# Patient Record
Sex: FEMALE | Race: OTHER | Employment: PART TIME | URBAN - NONMETROPOLITAN AREA
[De-identification: names, ages, dates, MRNs, and addresses within clinical notes are randomized per-mention and may not be internally consistent; named-entity substitution may affect disease eponyms.]

---

## 2017-05-22 ENCOUNTER — OFFICE VISIT (OUTPATIENT)
Dept: FAMILY MEDICINE CLINIC | Age: 47
End: 2017-05-22

## 2017-05-22 VITALS
WEIGHT: 154 LBS | DIASTOLIC BLOOD PRESSURE: 68 MMHG | HEART RATE: 56 BPM | HEIGHT: 61 IN | TEMPERATURE: 97.8 F | BODY MASS INDEX: 29.07 KG/M2 | SYSTOLIC BLOOD PRESSURE: 142 MMHG

## 2017-05-22 DIAGNOSIS — Z13.0 SCREENING FOR IRON DEFICIENCY ANEMIA: Primary | ICD-10-CM

## 2017-05-22 DIAGNOSIS — R60.9 EDEMA, UNSPECIFIED TYPE: ICD-10-CM

## 2017-05-22 DIAGNOSIS — R60.9 DEPENDENT EDEMA: ICD-10-CM

## 2017-05-22 DIAGNOSIS — D64.9 ANEMIA, UNSPECIFIED TYPE: ICD-10-CM

## 2017-05-22 DIAGNOSIS — G47.00 INSOMNIA, UNSPECIFIED TYPE: ICD-10-CM

## 2017-05-22 LAB — HGB BLD-MCNC: 10.4 G/DL

## 2017-05-22 RX ORDER — LANOLIN ALCOHOL/MO/W.PET/CERES
325 CREAM (GRAM) TOPICAL
Qty: 90 TAB | Refills: 1 | Status: SHIPPED | OUTPATIENT
Start: 2017-05-22 | End: 2017-07-13 | Stop reason: SDUPTHER

## 2017-05-22 RX ORDER — AMITRIPTYLINE HYDROCHLORIDE 10 MG/1
10 TABLET, FILM COATED ORAL
Qty: 30 TAB | Refills: 1 | Status: SHIPPED | OUTPATIENT
Start: 2017-05-22 | End: 2017-07-13 | Stop reason: SDUPTHER

## 2017-05-22 RX ORDER — HYDROCHLOROTHIAZIDE 12.5 MG/1
12.5 CAPSULE ORAL DAILY
Qty: 90 CAP | Refills: 1 | Status: SHIPPED | OUTPATIENT
Start: 2017-05-22 | End: 2017-07-13 | Stop reason: SDUPTHER

## 2017-05-22 NOTE — PROGRESS NOTES
Results for orders placed or performed in visit on 05/22/17   AMB POC HEMOGLOBIN (HGB)   Result Value Ref Range    Hemoglobin (POC) 10.4

## 2017-05-22 NOTE — PROGRESS NOTES
Assessment/Plan:    Nehal Garcia was seen today for breast pain and ankle swelling. Diagnoses and all orders for this visit:    Screening for iron deficiency anemia  -     AMB POC HEMOGLOBIN (HGB)    Insomnia, unspecified type  -     amitriptyline (ELAVIL) 10 mg tablet; Take 1 Tab by mouth nightly as needed for Sleep. cheryl 1 tab por la boca antes de acostarse    Edema, unspecified type  -     hydroCHLOROthiazide (MICROZIDE) 12.5 mg capsule; Take 1 Cap by mouth daily. Cheryl 1 capsula cada ale para los pies  h and p suggestive of dependent edema- buy support hose, increase physical activity    Anemia, unspecified type  -     ferrous sulfate (IRON, FERROUS SULFATE,) 325 mg (65 mg iron) tablet; Take 1 Tab by mouth Daily (before breakfast). Dependent edema  Elevate legs, increase physical conditioning    Gyn clinic for pap and mammo      Follow-up Disposition:  Return in about 2 months (around 7/22/2017) for [place] gyn clinic with me. BROOKLYN Justin expressed understanding of this plan. An AVS was printed and given to the patient.      ----------------------------------------------------------------------    Chief Complaint   Patient presents with    Breast pain     left breast pain     Ankle swelling       History of Present Illness:  50 yo here for return on an old problem of lisa LE swelling after working all day. She works in a [employer] where she is on her feet all day. The problem resolves overnight and then returns with standing. She has not tried support hose yet. She has been off of her elavil, iron and hctz for > 3 months when her rx's ran out. She states that the hctz really was helping with her swelling previously. She also notest that the hotter weather makes this problem worse- she states that there is no a.c. In her work place- she works 6 days a week most weeks    She continues to have heavy periods with blood clots at times. She bleeds monthly for 3-5 days.  She is due for her gyn exam and we will schedule this for her today. She has painful breast (left side) before her period but states that she has never felt a mass in it    She has insomnia that was improved with her elavil but has run out of her medication. She would like to go back on the elavil. Past Medical History:   Diagnosis Date    Allergic rhinitis, cause unspecified 2/25/2013    Chest pain 2/25/2013    Colitis 2/25/2013    Dermatitis due to drugs and medicines taken internally 2/25/2013    Edema 2/25/2013    Gallstones     Leg pain 2/25/2013    Lump or mass in breast 2/25/2013    Mastodynia 2/25/2013    Unspecified essential hypertension 2/25/2013    Unspecified otitis media 2/25/2013       Current Outpatient Prescriptions   Medication Sig Dispense Refill    ferrous sulfate (IRON, FERROUS SULFATE,) 325 mg (65 mg iron) tablet Take 1 Tab by mouth Daily (before breakfast). 90 Tab 1    amitriptyline (ELAVIL) 10 mg tablet Take 1 Tab by mouth nightly as needed for Sleep. chhaya 1 tab por la boca antes de acostarse 30 Tab 1    hydroCHLOROthiazide (MICROZIDE) 12.5 mg capsule Take 1 Cap by mouth daily. Chhaya 1 capsula cada ale para los pies 90 Cap 1    miconazole (MONISTAT 7) 2 % vaginal cream Insert 1 Applicator into vagina nightly. 45 g 0    predniSONE (DELTASONE) 10 mg tablet Take 6 pills today and then 1 pill less daily until done. [place] 6 pastillas hoy y osvaldo minus cada ale hasta de todas terminar 21 Tab 0    loratadine (CLARITIN) 10 mg tablet Take 1 Tab by mouth daily. Chhaya 1 tableta cada ale para la garganta 90 Tab 1    triamcinolone (NASACORT) 55 mcg nasal inhaler 1 [place] by Both Nostrils route two (2) times a day.  1 spray cada fosa nasal 2 veces al ale 1 Bottle 1       No Known Allergies    Social History   Substance Use Topics    Smoking status: Never Smoker    Smokeless tobacco: Never Used    Alcohol use No       Family History   Problem Relation Age of Onset    Diabetes       neice Physical Exam:     Visit Vitals    /68 (BP 1 Location: Left arm, BP Patient Position: Supine)    Pulse (!) 56    Temp 97.8 °F (36.6 °C) (Oral)    Ht 5' 1.02\" (1.55 m)    Wt 154 lb (69.9 kg)    LMP 05/09/2017    BMI 29.08 kg/m2   gen looks well, pleasant    A&Ox3  WDWN NAD  Respirations normal and non labored  Ext- no swelling today.  A few mild appearing varicose veins

## 2017-05-22 NOTE — MR AVS SNAPSHOT
Visit Information Karin Vazquez Personal Médico Departamento Teléfono del Dep. Número de visita 5/22/2017  9:15 AM Lilliam Hutchinson, AmandaMad[facility] Care SystemZHENG[address] [phone] Follow-up Instructions Return in about 2 months (around 7/22/2017) for [place] gyn clinic with me. Upcoming Health Maintenance Date Due  
 PAP AKA CERVICAL CYTOLOGY 1/17/2016 INFLUENZA AGE 9 TO ADULT 8/1/2017 DTaP/Tdap/Td series (2 - Td) 1/17/2023 Alergias  Review Complete El: 3/28/2016 Por: ALEKSANDER Sharma del:  5/22/2017 No Known Allergies Vacunas actuales Revisadas el:  1/17/2013 Shimon Mitten Influenza Vaccine 1/17/2013 Influenza Vaccine (Quad) PF 10/26/2015, 10/27/2014 Tdap 1/17/2013 No revisadas esta visita You Were Diagnosed With   
  
 Obed Corbett Screening for iron deficiency anemia    -  Primary ICD-10-CM: Z13.0 ICD-9-CM: V78.0 Insomnia, unspecified type     ICD-10-CM: G47.00 ICD-9-CM: 780.52 Edema, unspecified type     ICD-10-CM: R60.9 ICD-9-CM: 727. 3 Anemia, unspecified type     ICD-10-CM: D64.9 ICD-9-CM: 285.9 Dependent edema     ICD-10-CM: R60.9 ICD-9-CM: 589. 3 Partes vitales PS Pulso Temperatura [place] ( percentil de crecimiento) Peso (percentil de crecimiento) LMP (última nara) 142/68 (BP 1 Location: Left arm, BP Patient Position: Supine) (!) 56 97.8 °F (36.6 °C) (Oral) 5' 1.02\" (1.55 m) 154 lb (69.9 kg) 05/09/2017 BMI [facility]) Estado obstétrico Estatus de tabaquísmo 29.08 kg/m2 Having regular periods Never Smoker Historial de signos vitales BMI and BSA Data Body Mass Index Body Surface Area 29.08 kg/m 2 1.73 m 2 [facility] Name Phone [address] [facility] [phone] Araujo lista de medicamentos actualizada Lista actualizada el: 5/22/17 10:16 AM.  Yaya Pickens use chavarria lista de medicamentos más reciente. amitriptyline 10 mg tablet También conocido lynda:  ELAVIL Take 1 Tab by mouth nightly as needed for Sleep. cheryl 1 tab por la boca antes de acostarse  
  
 ferrous sulfate 325 mg (65 mg iron) tablet También conocido lynda:  Iron (Ferrous Sulfate) Take 1 Tab by mouth Daily (before breakfast). hydroCHLOROthiazide 12.5 mg capsule También conocido lynda:  Jane Langeper Take 1 Cap by mouth daily. Cheryl 1 capsula cada ale para los pies  
  
 loratadine 10 mg tablet También conocido lynda:  Emy Odell Take 1 Tab by mouth daily. Cheryl 1 tableta cada ale para la garganta  
  
 miconazole 2 % vaginal cream  
También conocido lynda:  MONISTAT 7 Insert 1 Applicator into vagina nightly. NASACORT 55 mcg nasal inhaler Medicamento genérico:  triamcinolone 1 [place] by Both Nostrils route two (2) times a day. 1 spray cada fosa nasal 2 veces al ale  
  
 predniSONE 10 mg tablet También conocido lynda:  Euel Salaam Take 6 pills today and then 1 pill less daily until done. [place] 6 pastillas hoy y osvaldo minus cada ale WATZING de todas terminar Recetas Enviado a la [place] Refills  
 ferrous sulfate (IRON, FERROUS SULFATE,) 325 mg (65 mg iron) tablet 1 Sig: Take 1 Tab by mouth Daily (before breakfast). Class: Normal  
 [id number] [address] [place] [address] Ph #: [phone] Route: Oral  
 amitriptyline (ELAVIL) 10 mg tablet 1 Sig: Take 1 Tab by mouth nightly as needed for Sleep. cheryl 1 tab por la boca antes de acostarse Class: Normal  
 [id number] [address] [place] [address] Ph #: [phone] Route: Oral  
 hydroCHLOROthiazide (MICROZIDE) 12.5 mg capsule 1 Sig: Take 1 Cap by mouth daily. Cheryl 1 capsula cada ale para los pies  Class: Normal  
 [facility]: [facility] [address], [facility] #: [phone] Route: Oral  
  
Hicimos lo siguiente AMB POC HEMOGLOBIN (HGB) [24122 CPT(R)] Instrucciones de seguimiento Return in about 2 months (around 7/22/2017) for [place] gyn clinic with me. Instrucciones para el Paciente Anemia: Instrucciones de cuidado - [ Anemia: Care Instructions ] Instrucciones de cuidado La anemia es un bajo nivel de glóbulos rojos, que son los que transportan el oxígeno por el organismo. Muchas cosas pueden causar anemia. La falta de constance es osvaldo de las causas más comunes. Chavarria organismo necesita constance para producir hemoglobina, osvaldo sustancia de los glóbulos rojos que transporta el oxígeno de los pulmones a las células del organismo. Si no hay suficiente constance, el organismo produce glóbulos rojos más pequeños y en karthik cantidad. Debido a ello, las células de chavarria organismo no obtienen suficiente oxígeno y usted se sentirá cansado y débil. Y puede tener dificultad para concentrarse. El sangrado es la causa más común de la falta de constance. Podría tener sangrado menstrual abundante o hemorragias causadas por afecciones tales lynda úlceras, hemorroides o cáncer. El uso habitual de aspirina u otros medicamentos antiinflamatorios (lynda ibuprofeno) también puede causar sangrado en [employer]. La falta de constance en chavarria dieta también puede causar anemia, sobre todo en los momentos en los que el organismo necesita más constance, lynda matty el Nickola Red, la infancia y la adolescencia. Es posible que chavarria médico le haya recetado pastillas de constance. El tratamiento puede kira algunos meses hasta que chon niveles de constance regresen a la normalidad. Chavarria médico también puede sugerirle que consuma alimentos ricos en constance, lynda carne y frijoles (habichuelas). Existen muchas otras causas de la anemia.  No siempre es causada por la falta de constance. Descubrir la causa específica de chavarria anemia le ayudará a chavarria médico a encontrar el tratamiento adecuado para usted. La atención de seguimiento es osvaldo parte clave de chavarria tratamiento y seguridad. Asegúrese de hacer y acudir a todas las citas, y llame a chavarria médico si está teniendo problemas. También es osvaldo buena idea saber los resultados de los exámenes y mantener osvaldo lista de los medicamentos que cheryl. Cómo puede cuidarse en el hogar? · [employer] medicamentos exactamente lynda le fueron recetados. Llame a chavarria médico si gallo estar teniendo problemas con chavarria medicamento. · Si chavarria médico le recomienda pastillas de constance, tómelas según las indicaciones: ¨ Trate de kira las pastillas con el estómago vacío 1 hora antes de comer o 2 horas después de comer. Tunde podría necesitar kira el constance con la comida para evitar el malestar estomacal. 
¨ No tome antiácidos, leche o bebidas con cafeína (lynda café, té o bebidas de cola) al [employer] o 2 horas antes o después de kasi tomado las pastillas de constance. Estos pueden dificultar la absorción del constance en el organismo. ¨ La vitamina C (de alimentos o suplementos) ayuda a chavarria organismo a absorber el constance. Trate de kira las pastillas de constance con un vaso de jugo de naranja o con otro tipo de alimento rico en vitamina C, lynda las frutas cítricas. ¨ Las Rik [employer]Mail de constance podrían causar [employer], lynda acidez [place], Meet, diarrea, estreñimiento y retortijones. Asegúrese de beber abundantes líquidos e incluya en chavarria dieta diaria frutas, verduras y [place]. Las pastillas de constance muchas veces hacen que chon evacuaciones ana oscuras o verdosas. ¨ Si olvida kira chavarria pastilla de constance, no tome osvaldo dosis doble la próxima vez. ¨ Mantenga las pastillas de constance fuera del alcance de los niños pequeños. La sobredosis de constance puede ser [employer].  
· Siga los consejos de chavarria médico acerca del consumo de alimentos ricos en constance. Entre estos se encuentran las mary ojeda, los [place], las aves, los [place], los frijoles, las uvas pasas, el pan integral y las verduras de hoja sascha. · Prepare las verduras al vapor para que puedan retener chavarria contenido de constance. Cuándo debe pedir ayuda? Llame al 911 en cualquier momento que considere que necesita atención de [place]. Por ejemplo, llame si: · Tiene síntomas de un ataque al corazón. Estos podrían incluir: ¨ Dolor o presión en el pecho, o osvaldo sensación extraña en el pecho. ¨ Sudoración. ¨ Falta de aire. ¨ Náuseas o vómito. ¨ Dolor, presión o osvaldo sensación extraña en la espalda, el jaquan, la mandíbula, la parte superior del abdomen o en david o ambos hombros o brazos. ¨ Aturdimiento o debilidad repentina. ¨ Latidos del corazón rápidos o irregulares. Después de llamar al 911, es posible que el operador le diga que mastique 1 aspirina para adultos o de 2 a 4 aspirinas de dosis baja. Espere a osvaldo ambulancia. No intente conducir usted mismo. · Se desmayó (perdió el conocimiento). Llame a chavarria médico ahora mismo o busque atención médica inmediata si: · Presenta nueva o mayor falta de aire. · Siente mareos o aturdimiento, o que está a punto de Only. · La fatiga y la debilidad continúan o empeoran. · Tiene cualquier sangrado anormal, lynda: 
¨ Hemorragias (sangrado) nasales. ¨ Sangrado vaginal que es distinto (más intenso, más frecuente, en un momento diferente del mes) del que usted [facility] tener. ¨ Heces sanguinolentas o negras, o sangrado por el recto. ¨ Orina sanguinolenta o de color pitts. Preste especial atención a los cambios en chavarria anmol y asegúrese de comunicarse con chavarria médico si: 
· No mejora lynda se esperaba. Dónde puede encontrar más información en inglés? Silvino Pablo a http://nate-vivek.info/. [place] [address] en la búsqueda para aprender más acerca de \"Anemia: Instrucciones de cuidado - [ Anemia: Care Instructions ]. \" Revisado: 13 octubre, 2016 Versión del contenido: 11.2 © 6855-4977 Healthwise, Incorporated. Las instrucciones de cuidado fueron adaptadas bajo licencia por Good Help Connections (which disclaims liability or warranty for this information). Si usted tiene [place] afección médica o sobre estas instrucciones, siempre pregunte a chavarria profesional de anmol. Healthwise, Incorporated niega toda garantía o responsabilidad por chavarria uso de esta información. Edema en la pierna y el tobillo: Instrucciones de cuidado - [ Leg and Ankle Edema: Care Instructions ] Instrucciones de cuidado La hinchazón en las piernas, los tobillos y los pies se conoce lynda edema. Es común después de que usted permanece sentado o de pie matty un tiempo. Los viajes prolongados en avión o automóvil a menudo causan hinchazón en las piernas y los pies. También podría tener hinchazón si tiene que permanecer de pie matty largos períodos de tiempo en chavarria [place]. Los problemas de las venas en las piernas (Coral Angst) y los cambios hormonales también pueden causar hinchazón. A veces la hinchazón de los tobillos y los pies es causada por un problema más jonatan, caridad lynda insuficiencia cardíaca, infección, coágulos de Emily, o enfermedad del [facility] Medical Centerado o del Jenny Sida. La atención de seguimiento es osvaldo parte clave de chavarria tratamiento y seguridad. Asegúrese de hacer y acudir a todas las citas, y llame a chavarria médico si está teniendo problemas. También es osvaldo buena idea saber los resultados de los exámenes y mantener osvaldo lista de los medicamentos que cheryl. Cómo puede cuidarse en el hogar? · Si chavarria médico le recetó un medicamento, tómelo según las indicaciones. Llame a chavarria médico si gallo estar teniendo problemas con chavarria medicamento. · Siempre que descanse, eleve chon piernas. Trate de mantener la daniel hinchada por encima del nivel del corazón. · Si permanece mucho tiempo de pie o sentado en la misma posición, tome descansos. ¨ Camine para aumentar el flujo de [employer] parte inferior de las piernas. ¨ Mueva los pies y los tobillos con frecuencia mientras permanezca de pie, o contraiga y relaje los músculos de las piernas. · Use medias elásticas de soporte. Póngaselas en la mañana, antes de que la hinchazón empeore. · Siga osvaldo dieta balanceada. Baje de peso si es necesario. · Limite la cantidad de sal (sodio) en chavarria dieta. La sal retiene líquidos en el cuerpo y podría aumentar la hinchazón. Cuándo debe pedir ayuda? Llame al 911 en cualquier momento que considere que necesita atención de emergencia. Por ejemplo, llame si: · Tiene síntomas de un coágulo de amarjit en el pulmón (llamado embolia pulmonar). Éstos podrían incluir: ¨ Dolor repentino en el pecho. ¨ Dificultad para respirar. ¨ Tos con amarjit. Llame a chavarria médico ahora mismo o busque atención médica inmediata si: · Tiene señales de un coágulo de amarjit, tales lynda: ¨ Dolor en la pantorrilla, el muslo, la caty o detrás de la rodilla. ¨ Enrojecimiento e hinchazón en la pierna o la caty. · Tiene síntomas de Sergo Greco, tales lynda: ¨ Aumento del dolor, la hinchazón, el enrojecimiento o la temperatura. ¨ Vetas rojizas o pus. [place]. Preste especial atención a los cambios en chavarria anmol y asegúrese de comunicarse con chavarria médico si: 
· La hinchazón está empeorando. · Tiene dolor nuevo o que empeora en las piernas. · No mejora lynda se esperaba. Dónde puede encontrar más información en inglés? Robin Apple a http://nate-vivek.info/. Latricia Lopez W867 en la búsqueda para aprender más acerca de \"Edema en la pierna y el tobillo: Instrucciones de cuidado - [ Leg and Ankle Edema: Care Instructions ]. \" 
Revisado: 27 cadena, 2016 Versión del contenido: 11.2 © 2038-8753 [employer], Incorporated.  Las instrucciones de cuidado fueron adaptadas bajo licencia por Good Help Connections (which disclaims liability or warranty for this information). Si usted tiene [place] afección médica o sobre estas instrucciones, siempre pregunte a chavarria profesional de anmol. [facility], Incorporated niega toda garantía o responsabilidad por chavarria uso de esta información. Introducing [facility] SERVICES! [facility] introduce portal paciente MyChart . Ahora se puede acceder a partes de chavarria expediente médico, enviar por correo electrónico la oficina de chavarria médico y solicitar renovaciones de medicamentos en línea. En chavarria navegador de Internet , James Butler a [email or web address] Josué clic en el usuario por Erich Nava? Marten Curly clic aquí en la sesión Barnie Milling. Verá la página de registro [place]. Ingrese chavarria código de [facility] caridad y lynda aparece a continuación. Papito no tendrá que [employer] código después de kasi completado el proceso de registro . Si usted no se inscribe antes de la fecha de caducidad , debe solicitar un nuevo código. · MyChart Código de acceso : [id number] Expires: 8/20/2017 10:16 AM 
 
Ingresa los últimos cuatro dígitos de chavarria Número de Seguro Social ( xxxx ) y fecha de nacimiento ( dd / mm / aaaa ) lynda se indica y josué clic en Enviar. Papito será llevado a la siguiente página de registro . Crear un ID MyChart . Esta será chavarria ID de inicio de sesión de MyChart y no puede ser [place] , por lo que pensar en osvaldo que es Gerldine Schirmer y fácil de recordar . Crear osvaldo contraseña MyChart . Papito puede cambiar chavarria contraseña en cualquier momento . Ingrese chavarria Password Reset de preguntas y [place] . [place] se puede utilizar en un momento posterior si usted olvida chavarria contraseña. Introduzca chavarria dirección de correo electrónico . Jelly Barahona recibirá osvaldo notificación por correo electrónico cuando la nueva información está disponible en MyChart . Devonda Shobha sierra en Registrarse.  Destin Jaz jaquelin y descargar porciones de chavarria expediente médico. 
 Estrella mariela en el enlace de descarga del menú Resumen para descargar osvaldo copia portátil de chavarria información médica . Si tiene [employer] , por favor visite la sección de preguntas frecuentes del sitio web MyChart . Recuerde, MyChart NO es que se utilizará para las necesidades urgentes. Para emergencias médicas , llame al 911 . Ahora disponible en chavarria iPhone y Android ! Por favor proporcione rylee resumen de la documentación de cuidado a chavarria próximo proveedor. Your primary care clinician is listed as Gricel Haney. If you have any questions after today's visit, please call [phone].

## 2017-05-22 NOTE — PATIENT INSTRUCTIONS
Anemia: Instrucciones de cuidado - [ Anemia: Care Instructions ]  Instrucciones de cuidado    La anemia es un bajo nivel de glóbulos rojos, que son los que transportan el oxígeno por el organismo. Muchas cosas pueden causar anemia. La falta de constance es osvaldo de las causas más comunes. Chavarria organismo necesita constance para producir hemoglobina, osvaldo sustancia de los glóbulos rojos que transporta el oxígeno de los pulmones a las células del organismo. Si no hay suficiente constance, el organismo produce glóbulos rojos más pequeños y en karthik cantidad. Debido a ello, las células de chavarria organismo no obtienen suficiente oxígeno y usted se sentirá cansado y débil. Y puede tener dificultad para concentrarse. El sangrado es la causa más común de la falta de constance. Podría tener sangrado menstrual abundante o hemorragias causadas por afecciones tales lynda úlceras, hemorroides o cáncer. El uso habitual de aspirina u otros medicamentos antiinflamatorios (lynda ibuprofeno) también puede causar sangrado en [employer]. La falta de constance en chavarria dieta también puede causar anemia, sobre todo en los momentos en los que el organismo necesita más constance, lynda matty el [facility], la infancia y la adolescencia. Es posible que chavarria médico le haya recetado pastillas de constance. El tratamiento puede kira algunos meses hasta que chon niveles de constance regresen a la normalidad. Chavarria médico también puede sugerirle que consuma alimentos ricos en constance, lynda carne y frijoles (habichuelas). Existen muchas otras causas de la anemia. No siempre es causada por la falta de constance. Descubrir la causa específica de chavarria anemia le ayudará a chavarria médico a encontrar el tratamiento adecuado para usted. La atención de seguimiento es osvaldo parte clave de chavarria tratamiento y seguridad. Asegúrese de hacer y acudir a todas las citas, y llame a chavarria médico si está teniendo problemas.  También es osvaldo buena idea saber los resultados de los exámenes y [employer] lista de los medicamentos que cheryl. ¿Cómo puede cuidarse en el hogar? · [employer] medicamentos exactamente lynda le fueron recetados. Llame a chavarria médico si gallo estar teniendo problemas con chavarria medicamento. · Si chavarria médico le recomienda pastillas de constance, tómelas según las indicaciones:  ¨ Trate de kira las pastillas con el estómago vacío 1 hora antes de comer o 2 horas después de comer. Tunde podría necesitar kira el constance con la comida para evitar el malestar estomacal.  ¨ No tome antiácidos, leche o bebidas con cafeína (lynda café, té o bebidas de cola) al [employer] o 2 horas antes o después de kasi tomado las pastillas de constance. Estos pueden dificultar la absorción del constance en el organismo. ¨ La vitamina C (de alimentos o suplementos) ayuda a chavarria organismo a absorber el constance. Trate de kira las pastillas de constance con un vaso de jugo de naranja o con otro tipo de alimento rico en vitamina C, lynda las frutas cítricas. ¨ Las Rik Inuvo de constance podrían causar [employer], lynda acidez Ernul, Meet, diarrea, estreñimiento y retortijones. Asegúrese de beber abundantes líquidos e incluya en chavarria dieta diaria frutas, verduras y [place]. Las pastillas de constance muchas veces hacen que chon evacuaciones ana oscuras o verdosas. ¨ Si olvida kira chavarria pastilla de constance, no tome osvaldo dosis doble la próxima vez. ¨ Mantenga las pastillas de constance fuera del alcance de los niños pequeños. La sobredosis de constance puede ser [employer]. · Siga los consejos de chavarria médico acerca del consumo de alimentos ricos en constance. Entre estos se encuentran las mary ojeda, los [place], las aves, los [place], los frijoles, las uvas pasas, el pan integral y las verduras de hoja sascha. · Prepare las verduras al vapor para que puedan retener chavarria contenido de constance. ¿Cuándo debe pedir ayuda? Llame al 911 en cualquier momento que considere que necesita atención de [place].  Por ejemplo, llame si:  · Tiene síntomas de un ataque al corazón. Estos podrían incluir:  ¨ Dolor o presión en el pecho, o osvaldo sensación extraña en el pecho. ¨ Sudoración. ¨ Falta de aire. ¨ Náuseas o vómito. ¨ Dolor, presión o osvaldo sensación extraña en la espalda, el jaquan, la mandíbula, la parte superior del abdomen o en david o ambos hombros o brazos. ¨ Aturdimiento o debilidad repentina. ¨ Latidos del corazón rápidos o irregulares. Después de llamar al 911, es posible que el operador le diga que mastique 1 aspirina para adultos o de 2 a 4 aspirinas de dosis baja. Espere a osvaldo ambulancia. No intente conducir usted mismo. · Se desmayó (perdió el conocimiento). Llame a chavarria médico ahora mismo o busque atención médica inmediata si:  · Presenta nueva o mayor falta de aire. · Siente mareos o aturdimiento, o que está a punto de [place]. · La fatiga y la debilidad continúan o empeoran. · Tiene cualquier sangrado anormal, lynda:  ¨ Hemorragias (sangrado) nasales. ¨ Sangrado vaginal que es distinto (más intenso, más frecuente, en un momento diferente del mes) del que usted [facility] tener. ¨ Heces sanguinolentas o negras, o sangrado por el recto. ¨ Orina sanguinolenta o de color pitts. Preste especial atención a los cambios en chavarria anmol y asegúrese de comunicarse con chavarria médico si:  · No mejora lynda se esperaba. ¿Dónde puede encontrar más información en inglés? Jim Watkins a http://nate-vivek.info/. [place] N130 en la búsqueda para aprender más acerca de \"Anemia: Instrucciones de cuidado - [ Anemia: Care Instructions ]. \"  Revisado: 13 octubre, 2016  Versión del contenido: 11.2  © 1974-5046 [employer], [place]. Las instrucciones de cuidado fueron adaptadas bajo licencia por [employer] (which disclaims liability or warranty for this information). Si usted tiene [place] afección médica o sobre estas instrucciones, siempre pregunte a chavarria profesional de anmol.  Angelrosalva Bertrand por chavarria uso de esta información. Edema en la pierna y el tobillo: Instrucciones de cuidado - [ Leg and Ankle Edema: Care Instructions ]  Instrucciones de cuidado  La hinchazón en las piernas, los tobillos y los pies se conoce lynda edema. Es común después de que usted permanece sentado o de pie matty un tiempo. Los viajes prolongados en avión o automóvil a menudo causan hinchazón en las piernas y los pies. También podría tener hinchazón si tiene que permanecer de pie matty largos períodos de tiempo en chavarria [place]. Los problemas de las venas en las piernas ([place]) y los cambios hormonales también pueden causar hinchazón. A veces la hinchazón de los tobillos y los pies es causada por un problema más jonatan, caridad lynda insuficiencia cardíaca, infección, coágulos de [place], o enfermedad del hígado o del [place]. La atención de seguimiento es osvaldo parte clave de chavarria tratamiento y seguridad. Asegúrese de hacer y acudir a todas las citas, y llame a chavarria médico si está teniendo problemas. También es osvaldo buena idea saber los resultados de los exámenes y mantener osvaldo lista de los medicamentos que cheryl. ¿Cómo puede cuidarse en el hogar? · Si chavarria médico le recetó un medicamento, tómelo según las indicaciones. Llame a chavarria médico si gallo estar teniendo problemas con chavarria medicamento. · Siempre que descanse, eleve chon piernas. Trate de mantener la daniel hinchada por encima del nivel del corazón. · Si permanece mucho tiempo de pie o sentado en la misma posición, tome descansos. ¨ Camine para aumentar el flujo de [employer] parte inferior de las piernas. ¨ Mueva los pies y los tobillos con frecuencia mientras permanezca de pie, o contraiga y relaje los músculos de las piernas. · Use medias elásticas de soporte. Póngaselas en la mañana, antes de que la hinchazón empeore. · Siga osvaldo dieta balanceada. Baje de peso si es necesario. · Limite la cantidad de sal (sodio) en chavarria dieta.  La sal retiene líquidos en el cuerpo y podría aumentar la hinchazón. ¿Cuándo debe pedir ayuda? Llame al 911 en cualquier momento que considere que necesita atención de emergencia. Por ejemplo, llame si:  · Tiene síntomas de un coágulo de amarjit en el pulmón (llamado embolia pulmonar). Éstos podrían incluir:  ¨ Dolor repentino en el pecho. ¨ Dificultad para respirar. ¨ Tos con amarjit. Llame a chavarria médico ahora mismo o busque atención médica inmediata si:  · Tiene señales de un coágulo de amarjit, tales lynda:  ¨ Dolor en la pantorrilla, el muslo, la caty o detrás de la rodilla. ¨ Enrojecimiento e hinchazón en la pierna o la caty. · Tiene síntomas de Sergo [place]co, tales lynda:  ¨ Aumento del dolor, la hinchazón, el enrojecimiento o la temperatura. ¨ Vetas rojizas o pus. Lizbeth García. Preste especial atención a los cambios en chavarria anmol y asegúrese de comunicarse con chavarria médico si:  · La hinchazón está empeorando. · Tiene dolor nuevo o que empeora en las piernas. · No mejora lynda se esperaba. ¿Dónde puede encontrar más información en inglés? Charles Kiran a http://nate-vivek.info/. Korin Person Y995 en la búsqueda para aprender más acerca de \"Edema en la pierna y el tobillo: Instrucciones de cuidado - [ Leg and Ankle Edema: Care Instructions ]. \"  Revisado: 27 cadena, 2016  Versión del contenido: 11.2  © 8412-2597 [employer], [employer]. Las instrucciones de cuidado fueron adaptadas bajo licencia por [employer] (which disclaims liability or warranty for this information). Si usted tiene [place] afección médica o sobre estas instrucciones, siempre pregunte a chavarria profesional de anmol. [facility]orporated niega toda garantía o responsabilidad por chavarria uso de esta información.

## 2017-06-08 ENCOUNTER — HOSPITAL ENCOUNTER (EMERGENCY)
Age: 47
Discharge: HOME | End: 2017-06-08
Attending: EMERGENCY MEDICINE
Payer: SELF-PAY

## 2017-06-08 VITALS
WEIGHT: 152 LBS | SYSTOLIC BLOOD PRESSURE: 125 MMHG | DIASTOLIC BLOOD PRESSURE: 81 MMHG | HEART RATE: 69 BPM | BODY MASS INDEX: 27.97 KG/M2 | OXYGEN SATURATION: 100 % | TEMPERATURE: 97.8 F | HEIGHT: 62 IN | RESPIRATION RATE: 16 BRPM

## 2017-06-08 DIAGNOSIS — N64.4 BREAST PAIN, LEFT: Primary | ICD-10-CM

## 2017-06-08 PROCEDURE — 99282 EMERGENCY DEPT VISIT SF MDM: CPT

## 2017-06-08 RX ORDER — NAPROXEN 500 MG/1
500 TABLET ORAL
Qty: 20 TAB | Refills: 0 | Status: SHIPPED | OUTPATIENT
Start: 2017-06-08

## 2017-06-08 NOTE — DISCHARGE INSTRUCTIONS
Dolor de senos: Instrucciones de cuidado - [ Breast Pain: Care Instructions ]  Instrucciones de cuidado  La sensibilidad y el dolor de senos podría aparecer y desaparecer con los periodos menstruales (cíclico) o podría no seguir ningún patrón (no cíclico). El dolor de senos kendy vez es causado por un problema de anmol grave. Podría ser necesario hacer pruebas para averiguar la causa. La atención de seguimiento es osvaldo parte clave de chavarria tratamiento y seguridad. Asegúrese de hacer y acudir a todas las citas, y llame a chavarria médico si está teniendo problemas. También es osvaldo buena idea saber los resultados de los exámenes y mantener osvaldo lista de los medicamentos que cheryl. ¿Cómo puede cuidarse en el hogar? · Si chavarria médico le recetó un medicamento, tómelo exactamente según las indicaciones. Llame a chavarria médico si gallo estar teniendo problemas con chavarria medicamento. · Para aliviar el dolor y la hinchazón, tome un analgésico (medicamento para el dolor) de venta rosibel, lynda acetaminofén (Tylenol), ibuprofeno (Advil, Motrin) o naproxeno (Aleve). Batsheva y siga todas las instrucciones de la [place]. · No tome dos o más analgésicos al [employer], a menos que el médico se lo haya indicado. Muchos analgésicos contienen acetaminofén, es decir, Tylenol. El exceso de acetaminofén (Tylenol) puede ser dañino. · Use un sostén que le dé buen soporte, lynda los que se usan para hacer deporte o correr. · Reduzca la cantidad de grasa en chavarria dieta. Si necesita ayuda para planificar comidas saludables, consulte a un dietista. · Estrella por lo menos 30 minutos de ejercicio la mayoría de los días de la [place]. Caminar es osvaldo buena opción. Es posible que también quiera hacer otras actividades, lynda correr, nadar, [employer], o jugar al tenis o practicar otros deportes de equipo. · Mantenga un patrón de sueño saludable. Eri Michel a dormir a la misma hora todas las noches y levántese a la misma hora cada día. ¿Cuándo debe pedir ayuda?   Llame a araujo médico ahora mismo o busque atención médica inmediata si:  · Tiene cambios nuevos en un seno, lynda:  ¨ Un bulto o engrosamiento en el seno o la axila. ¨ Un cambio en el tamaño o la forma del seno. ¨ Cambios en la piel, lynda hoyuelos o arrugas. ¨ Secreción de [employer]. ¨ Un cambio en el color o la textura de la piel del seno o la daniel oscura alrededor del pezón (areola). ¨ Un cambio en la forma del pezón (podría parecer lynda si estuviera hundido en el seno). · Tiene síntomas de infección en el seno, tales lynda:  ¨ Aumento del dolor, la hinchazón, el enrojecimiento o la temperatura alrededor del seno. ¨ Vetas rojizas que se extienden desde el seno. ¨ Pus que supura de un seno. Neomia Revels. Preste especial atención a los cambios en araujo anmol y asegúrese de comunicarse con araujo médico si:  · Araujo dolor de los senos no disminuye después de 1 semana. · Tiene un bulto o engrosamiento en el seno o la axila. ¿Dónde puede encontrar más información en inglés? Gualberto Gaspar a http://nate-vivek.info/. Stephanie Barkley V051 en la búsqueda para aprender más acerca de \"Dolor de senos: Instrucciones de cuidado - [ Breast Pain: Care Instructions ]. \"  Revisado: 27 cadena, 2016  Versión del contenido: 11.2  © 9222-9071 [employer]. Las instrucciones de cuidado fueron adaptadas bajo licencia por [employer] (which disclaims liability or warranty for this information). Si usted tiene [place] afección médica o sobre estas instrucciones, siempre pregunte a araujo profesional de anmol. [facility]orporated niega toda garantía o responsabilidad por araujo uso de esta información. We hope that we have addressed all of your medical concerns. The examination and treatment you received in the Emergency Department were for an emergent problem and were not intended as complete care. It is important that you follow up with your healthcare provider(s) for ongoing care.  If your symptoms worsen or do not improve as expected, and you are unable to reach your usual health care provider(s), you should return to the Emergency Department. Today's healthcare is undergoing tremendous change, and patient satisfaction surveys are one of the many tools to assess the quality of medical care. You may receive a survey from the [employer] regarding your experience in the Emergency Department. I hope that your experience has been completely positive, particularly the medical care that I provided. As such, please participate in the survey; anything less than excellent does not meet my expectations or intentions. [facility] and [facility] participate in nationally recognized quality of care measures. If your blood pressure is greater than 120/80, as reported below, we urge that you seek medical care to address the potential of high blood pressure, commonly known as hypertension. Hypertension can be hereditary or can be caused by certain medical conditions, pain, stress, or \"white coat syndrome. \"       Please make an appointment with your health care provider(s) for follow up of your Emergency Department visit. VITALS:   Patient Vitals for the past 8 hrs:   Temp Pulse Resp BP SpO2   06/08/17 0900 97.8 °F (36.6 °C) 69 16 125/81 100 %          Thank you for allowing us to provide you with medical care today. We realize that you have many choices for your emergency care needs. Please choose us in the future for any continued health care needs. Aric Jones, [facility] Brayden Shon Dickinson: [phone]            No results found for this or any previous visit (from the past 24 hour(s)). No results found.

## 2017-06-08 NOTE — ED TRIAGE NOTES
NOticed a lump in her L breast 3 weeks ago. Went to [facility] and had a f/u appt. Made with OBGYN but it isn't for another month. Reports increased pain and warmth today.

## 2017-06-08 NOTE — ED PROVIDER NOTES
HPI Comments: Bjorn Reyes is a 51 y.o. female who presents ambulatory to the ED with a c/o left breast pain intermittently x 3 weeks, worse x 3 days. Pt notes she has a hx of similar pain associated with her menses. She notes her breast was red, swollen and warm 3 nights ago. She has been taking ibuprofen with minimal relief. Pt states she was seen by the caravan 3 weeks ago and has an appointment with the OB/GYN clinic in 2 months. Pt states she can not wait 2 months to see them and get a mammogram. Pt would like a mammogram in the ER. She specifically denies any fevers, chills, nausea, vomiting, chest pain, abd pain, urinary sx, shortness of breath, headache, rash, diarrhea, sweating or weight loss. Elia Gordon PCP: Arnaldo Lewis MD  PMHx significant for: Past Medical History:  2013: Allergic rhinitis, cause unspecified  2013: Chest pain  2013: Colitis  2013: Dermatitis due to drugs and medicines taken in*  2013: Edema  No date: Gallstones  2013: Leg pain  2013: Lump or mass in breast  2013: Mastodynia  2013: Unspecified essential hypertension  2013: Unspecified otitis media  PSHx significant for: Past Surgical History:  10/2011: HX BREAST BIOPSY      Comment: LEFT, for fibroadenoma  No date: HX  SECTION      Comment: x2  No date: HX CHOLECYSTECTOMY  No date: HX TUBAL LIGATION  Social Hx: Tobacco: denies EtOH: denies Illicit drug use: denies    There are no further complaints or symptoms at this time. The history is provided by the patient.         Past Medical History:   Diagnosis Date    Allergic rhinitis, cause unspecified 2013    Chest pain 2013    Colitis 2013    Dermatitis due to drugs and medicines taken internally 2013    Edema 2013    Gallstones     Leg pain 2013    Lump or mass in breast 2013    Mastodynia 2013    Unspecified essential hypertension 2013    Unspecified otitis media 2013       Past Surgical History:   Procedure Laterality Date    HX BREAST BIOPSY  10/2011    LEFT, for fibroadenoma    HX  SECTION      x2    HX CHOLECYSTECTOMY      HX TUBAL LIGATION           Family History:   Problem Relation Age of Onset    Diabetes       neice       Social History     Social History    Marital status: SINGLE     Spouse name: N/A    Number of children: N/A    Years of education: N/A     Occupational History    Not on file. Social History Main Topics    Smoking status: Never Smoker    Smokeless tobacco: Never Used    Alcohol use No    Drug use: No    Sexual activity: Not Currently     Other Topics Concern    Not on file     Social History Narrative         ALLERGIES: Review of patient's allergies indicates no known allergies. Review of Systems   Constitutional: Negative for chills and fever. HENT: Negative for congestion, rhinorrhea, sneezing and sore throat. Eyes: Negative for redness and visual disturbance. Respiratory: Negative for shortness of breath. Cardiovascular: Negative for chest pain and leg swelling. Gastrointestinal: Negative for abdominal pain, nausea and vomiting. Genitourinary: Negative for difficulty urinating and frequency. Musculoskeletal: Negative for back pain, myalgias and neck stiffness. Skin: Negative for rash. Left breast pain   Neurological: Negative for dizziness, syncope, weakness and headaches. Hematological: Negative for adenopathy. Vitals:    17 0900   BP: 125/81   Pulse: 69   Resp: 16   Temp: 97.8 °F (36.6 °C)   SpO2: 100%   Weight: 68.9 kg (152 lb)   Height: 5' 2\" (1.575 m)            Physical Exam   Constitutional: She is oriented to person, place, and time. She appears well-developed and well-nourished. No distress. HENT:   Head: Normocephalic and atraumatic. Right Ear: External ear normal.   Left Ear: External ear normal.   Neck: Neck supple.    Cardiovascular: Normal rate, regular rhythm, normal heart sounds and intact distal pulses. Exam reveals no gallop and no friction rub. No murmur heard. Pulmonary/Chest: Effort normal and breath sounds normal. No stridor. No respiratory distress. She has no wheezes. She has no rales. She exhibits tenderness. Left medial breast at 3 o'clock position focally TTP without swelling, no masses or lesions noted. No step off. No discoloration. No deformity or lesions. No nipple eversion, no nipple discharge, no breast dimpling. Abdominal: Soft. Bowel sounds are normal. She exhibits no distension and no mass. There is no tenderness. There is no rebound and no guarding. Musculoskeletal: Normal range of motion. She exhibits no edema, tenderness or deformity. Neurological: She is alert and oriented to person, place, and time. No cranial nerve deficit. Coordination normal.   Skin: No rash noted. No erythema. No pallor. Psychiatric: She has a normal mood and affect. Her behavior is normal.   Nursing note and vitals reviewed. MDM  Number of Diagnoses or Management Options  Breast pain, left:      Amount and/or Complexity of Data Reviewed  Review and summarize past medical records: yes  Independent visualization of images, tracings, or specimens: yes    Patient Progress  Patient progress: stable    ED Course       Procedures    9:41 AM  Discussed pt, sx, hx and current findings with Dr Cris Dai. He is in agreement with plan. Will give pt info for Dr Chyrl Fleischer at the [facility] and encourage pt to return to [facility] for sooner mammogram appt. Opal Leavitt. BROOKLYN Jones      LABORATORY TESTS:  No results found for this or any previous visit (from the past 12 hour(s)). IMAGING RESULTS:  No orders to display       MEDICATIONS GIVEN:  Medications - No data to display    IMPRESSION:  1. Breast pain, left        PLAN:  1.    Discharge Medication List as of 6/8/2017  9:40 AM      START taking these medications    Details   naproxen (NAPROSYN) 500 mg tablet Take 1 Tab by mouth every twelve (12) hours as needed for Pain., Print, Disp-20 Tab, R-0         CONTINUE these medications which have NOT CHANGED    Details   ferrous sulfate (IRON, FERROUS SULFATE,) 325 mg (65 mg iron) tablet Take 1 Tab by mouth Daily (before breakfast). , Normal, Disp-90 Tab, R-1      amitriptyline (ELAVIL) 10 mg tablet Take 1 Tab by mouth nightly as needed for Sleep. cheryl 1 tab por la boca antes de acostarse, Normal, Disp-30 Tab, R-1      hydroCHLOROthiazide (MICROZIDE) 12.5 mg capsule Take 1 Cap by mouth daily. Cheryl 1 capsula cada ale para los pies, Normal, Disp-90 Cap, R-1      loratadine (CLARITIN) 10 mg tablet Take 1 Tab by mouth daily. Cheryl 1 tableta cada ale para la garganta, Normal, Disp-90 Tab, R-1      triamcinolone (NASACORT) 55 mcg nasal inhaler 1 Rudolph by Both Nostrils route two (2) times a day. 1 spray cada fosa nasal 2 veces al ale, OTC, Disp-1 Bottle, R-1         STOP taking these medications       miconazole (MONISTAT 7) 2 % vaginal cream Comments:   Reason for Stopping:         predniSONE (DELTASONE) 10 mg tablet Comments:   Reason for Stoppin.   Follow-up Information     Follow up With Details Comments Contact Info    Randall Mccarthy MD Schedule an appointment as soon as possible for a visit 2-4 days for recheck [address] [facility]      Shanel Curiel MD Schedule an appointment as soon as possible for a visit 2-4 days for recheck [place] [address]  [phone]          Return to ED if worse       9:41 AM  Pt has been reexamined. Pt has no new complaints, changes or physical findings. Care plan outlined and precautions discussed. All available results were reviewed with pt. All medications were reviewed with pt. All of pt's questions and concerns were addressed.  Pt agrees to F/U as instructed and agrees to return to ED upon further deterioration. Pt is ready to go home.   ROXANE Orr

## 2017-06-08 NOTE — LETTER
[address] 
[facility] EMERGENCY DEPT 
[address] [facility] [address] [id number] [phone] Work/School Note Date: 6/8/2017 To Whom It May concern: 
 
Magnolia Mosley was seen and treated today in the emergency room by the following provider(s): 
Attending Provider: Robert Damon MD 
Physician Assistant: ROXANE Lawson. Magnolia Mosley may return to work on 6/9/17.  
 
Sincerely, 
 
 
 
 
ROXANE Lawson

## 2017-06-12 ENCOUNTER — PATIENT OUTREACH (OUTPATIENT)
Dept: FAMILY MEDICINE CLINIC | Age: 47
End: 2017-06-12

## 2017-06-12 NOTE — PROGRESS NOTES
[address] Note. Language spoken:  Bulgarian      Advance Care Planning:   Patient was offered the opportunity to discuss advance care planning:  no     Does patient have an Advance Directive:  no   If no, did you provide information on Caring Connections?  no     PCP/Specialist follow up: Patient scheduled to follow up with [facility]  on 2017 at 2:00 pm.  Reviewed red flags with patient, and patient verbalizes understanding. Patient given an opportunity to ask questions. No other clinical/social/functional needs noted. The patient agrees to contact the PCP office for questions related to their healthcare. The patient expressed thanks, offered no additional questions and ended the call. Medication:   Performed medication reconciliation with patient, and patient verbalizes understanding of administration of home medications. There were no barriers to obtaining medications identified at this time. EDUCATION:  Role of NN with contact information and hours of operation. Support system:  patient    Discharge Instructions :  Reviewed discharge instructions with patient. Patient verbalizes understanding of discharge instructions and follow-up care. Red Flags: Mass on breast, Mammo, Pap exam.     Labs Reviewed:  No results for input(s): WBC, HGB, HCT, PLT, HGBEXT, HCTEXT, PLTEXT in the last 72 hours. Medical History:     Past Medical History:   Diagnosis Date    Allergic rhinitis, cause unspecified 2013    Chest pain 2013    Colitis 2013    Dermatitis due to drugs and medicines taken internally 2013    Edema 2013    Gallstones     Leg pain 2013    Lump or mass in breast 2013    Mastodynia 2013    Unspecified essential hypertension 2013    Unspecified otitis media 2013           Nurse Navigator(NN) contacted the patient by telephone to perform post ED discharge assessment. Verified  and address with patient as identifiers. Provided introduction to self, and explanation of the Nurses Navigator role. Diet:   Patient reports: regular diet. Activity:    Patient reports: mostly moving around the house and somewalking outside the house.     Goals Addressed      Identification of barriers to adherence to a plan of care such as inability to afford medications, lack of insurance, lack of transportation, etc.                  [facility] will keep [facility] appointment for Mammo and Pap, July 9, 2017 at 9:00am. IM

## 2017-07-10 ENCOUNTER — OFFICE VISIT (OUTPATIENT)
Dept: FAMILY MEDICINE CLINIC | Age: 47
End: 2017-07-10

## 2017-07-10 ENCOUNTER — HOSPITAL ENCOUNTER (OUTPATIENT)
Dept: LAB | Age: 47
Discharge: HOME | End: 2017-07-10

## 2017-07-10 VITALS
HEIGHT: 61 IN | BODY MASS INDEX: 29.3 KG/M2 | SYSTOLIC BLOOD PRESSURE: 133 MMHG | WEIGHT: 155.2 LBS | DIASTOLIC BLOOD PRESSURE: 56 MMHG | HEART RATE: 76 BPM | TEMPERATURE: 98 F

## 2017-07-10 DIAGNOSIS — N94.6 PAINFUL MENSTRUAL PERIODS: ICD-10-CM

## 2017-07-10 DIAGNOSIS — Z01.419 ENCOUNTER FOR WELL WOMAN EXAM: Primary | ICD-10-CM

## 2017-07-10 PROCEDURE — 88142 CYTOPATH C/V THIN LAYER: CPT | Performed by: PHYSICIAN ASSISTANT

## 2017-07-10 RX ORDER — IBUPROFEN 600 MG/1
600 TABLET ORAL
Qty: 60 TAB | Refills: 1 | Status: SHIPPED | OUTPATIENT
Start: 2017-07-10

## 2017-07-10 NOTE — PATIENT INSTRUCTIONS
Cólicos menstruales dolorosos: Instrucciones de cuidado - [ Painful Menstrual Cramps: Care Instructions ]  Instrucciones de cuidado    Los cólicos menstruales dolorosos son muy comunes. Muchas mujeres van al médico por cólicos intensos cuando tienen chavarria período. Usted puede tener calambres en la espalda, los muslos y el abdomen. También podría tener diarrea, estreñimiento o náuseas. Algunas mujeres también se marean. Los analgésicos (medicamentos para el dolor) y el tratamiento en el hogar pueden ayudar a que se sienta mejor. La atención de seguimiento es osvaldo parte clave de chavarria tratamiento y seguridad. Asegúrese de hacer y acudir a todas las citas, y llame a chavarria médico si está teniendo problemas. También es osvaldo buena idea saber los resultados de los exámenes y mantener osvaldo lista de los medicamentos que cheryl. ¿Cómo puede cuidarse en el hogar? · [place] antiinflamatorios para el dolor. El ibuprofeno (Advil, Motrin) y el naproxeno (Aleve) suelen funcionar mejor que la aspirina. ¨ Sea moody con los medicamentos. Hable con chavarria médico o chavarria farmacéutico antes de kira cualquiera de estos medicamentos. Podrían no ser seguros si cheryl otros medicamentos o si tiene otros problemas de [address] ¨ Comience a kira la dosis recomendada de analgésico tan pronto lynda sienta dolor. O puede comenzar el día anterior al inicio de chavarria período menstrual. Siga tomando el medicamento por todo el tiempo que tenga cólicos. ¨ Si los medicamentos antiinflamatorios no ayudan, intente con acetaminofén (Tylenol). ¨ No tome dos o más analgésicos al [employer], a menos que el médico se lo haya indicado. Muchos analgésicos contienen acetaminofén, es decir, Tylenol. El exceso de acetaminofén (Tylenol) puede ser dañino. ¨ [place] y siga todas las instrucciones de la etiqueta. · Póngase osvaldo almohadilla térmica ajustada a baja temperatura o osvaldo bolsa de [place] sobre el abdomen. O dese un baño de agua tibia.  El calor mejora el flujo de amarjit y podría aliviar el dolor. · Acuéstese y ponga osvaldo almohada debajo de chon rodillas. O acuéstese de lado con las rodillas dobladas hacia el pecho. [place] ayudará con cualquier tensión en la espalda. · Estrella al menos 30 minutos de ejercicio la mayoría de los días de la [place]. [place] mejora el flujo de amarjit y podría reducir el dolor. Caminar es osvaldo buena opción. Es posible que también quiera hacer otras actividades, lynda correr, nadar, [employer], o jugar al tenis u otros deportes de equipo. ¿Cuándo debe pedir ayuda? Llame al 911 en cualquier momento que considere que necesita atención de [place]. Por ejemplo, llame si:  · Se desmayó (perdió el conocimiento). Llame a chavarria médico ahora mismo o busque atención médica inmediata si:  · Tiene dolor repentino e intenso en el abdomen o la pelvis. · Tiene sangrado vaginal intenso. Intenso significa que empapa las toallas sanitarias o los tampones usuales cada hora, matty 2 o más horas, y elimina coágulos de [place]. · Siente mareos o aturdimiento, o que está a punto de [place]. Preste especial atención a los cambios en chavarria anmol y asegúrese de comunicarse con chavarria médico si:  · Piensa que pudiera estar Gretchen Blaze. · Tiene un dolor nuevo en el abdomen o la pelvis. · Está tomando un analgésico, jg no la está ayudando. · Se siente débil y cansada. ¿Dónde puede encontrar más información en inglés? Yoli Hemphill a http://nate-vivek.info/. Lakshmi Laboy Z712 en la búsqueda para aprender más acerca de \"Cólicos menstruales dolorosos: Instrucciones de cuidado - [ Painful Menstrual Cramps: Care Instructions ]. \"  Revisado: 13 octubre, 2016  Versión del contenido: 11.3  © 1940-9898 Healthwise, Incorporated. Las instrucciones de cuidado fueron adaptadas bajo licencia por Good Help Connections (which disclaims liability or warranty for this information).  Si usted tiene Carlotta [place] afección médica o sobre estas instrucciones, siempre pregunte a chavarria profesional de anmol. [facility] niega toda garantía o responsabilidad por chavarria uso de esta información. Visita de control para personas de 18 a 50 años: Instrucciones de cuidado - [ Well Visit, Ages 25 to 48: Care Instructions ]  Instrucciones de cuidado  Los exámenes físicos pueden ayudarle a mantenerse saludable. Chavarria médico galarza revisado chavarria estado general de anmol y podría haberle dado algunos consejos para cuidarse. También podría haberle recomendado otros exámenes. En chavarria hogar, usted puede ayudar a prevenir enfermedades si come de manera saludable, hace ejercicio con regularidad y sigue otras recomendaciones. La atención de seguimiento es osvaldo parte clave de chavarria tratamiento y seguridad. Asegúrese de hacer y acudir a todas las citas, y llame a chavarria médico si está teniendo problemas. También es osvaldo buena idea saber los resultados de chon exámenes y mantener osvaldo lista de los medicamentos que cheryl. ¿Cómo puede cuidarse en el hogar? · Alcance un peso saludable y [place]. [place] disminuirá el riesgo de tener [employer], tales lynda obesidad, diabetes, enfermedad cardíaca y presión arterial kadeem. · Estrella actividad física por lo menos 30 minutos la mayoría de los días de la [place]. Caminar es osvaldo buena opción. Corona Hendersonyles desee hacer otras actividades, lynda correr, nadar, [employer], o jugar al tenis u otros deportes de equipo. Discuta con chavarria médico cualquier cambio que quiera introducir en chavarria programa de ejercicios. · No fume ni permita que otros fumen cerca de usted. Si necesita ayuda para dejar de fumar, hable con chavarria médico sobre programas y medicamentos para dejar de fumar. Pueden aumentar chon probabilidades de dejar el hábito para siempre. · Consulte con chavarria médico si presenta factores de riesgo de infecciones de transmisión sexual (STI, por chon siglas en inglés).  Tener osvaldo nir mago sexual (que no tenga STI y que no tenga relaciones sexuales con nadie más) es osvaldo buena [place] de evitar estas infecciones. · Utilice métodos anticonceptivos si no desea tener hijos en rylee momento. Consulte con chavarria médico acerca de las opciones disponibles más adecuadas para usted. · Protéjase la piel del exceso de sol. [address] 10 a.m. y las 4 p.m., permanezca a la gabriella o Lucia Fisherman con prendas de vestir y un sombrero de ala ancha. Use gafas de sol que bloqueen los milla ultravioleta. Póngase un protector solar de amplio espectro (SPF 30 o superior) en la piel expuesta, incluso cuando esté nublado. · Acuda al dentista [facility] veces al año para hacerse chequeos y limpiezas dentales. · En el automóvil, use el cinturón de seguridad. · Meghan alcohol en forma moderada, o no meghan nada. [place] significa no más de 2 bebidas al día si es hombre, y no más de 1 al día si es [place]. Siga las recomendaciones de chavarria médico acerca de cuándo hacerse determinados exámenes. Estas pruebas pueden detectar problemas a tiempo. Para ambos sexos  · Colesterol. Hágase un análisis de la grasa (colesterol) en la amarjit después de los 21 años de [place]. Chavarria médico le indicará con qué frecuencia debe [employer] análisis según chavarria edad, chon antecedentes familiares u otros factores que pueden aumentar el riesgo de enfermedad cardíaca. · Presión arterial. Hágase kira la presión arterial matty osvaldo visita de rutina al médico. Chavarria médico le dirá con qué frecuencia debe revisarse la presión arterial según chavarria edad, chon niveles de presión arterial y otros factores. · Visión. Hable con chavarria médico acerca de la frecuencia con que debe hacerse osvaldo prueba de glaucoma. · Diabetes. Pregúntele a chavarria médico si debería hacerse pruebas para la diabetes. · Cáncer de colon. Hágase osvaldo prueba para el cáncer de colon a los 48 años. Usted podría hacerse osvaldo de las [address] Si tiene menos de [address], podría necesitar hacerse osvaldo prueba antes si tiene factores de [place].  [facility] si ya le butler extraído un pólipo precanceroso del colon o si alguno de chon padres, hermanos o hijos galarza tenido cáncer de colon. Para las mujeres  · El examen de senos y la [place]. Pregúntele a araujo médico cuándo debe hacerse un examen clínico de los senos (mamas) y [place]. Los expertos médicos no están de acuerdo en si osvaldo pepe de menos de 48 años de edad debe o no hacerse estos exámenes o con qué frecuencia. Araujo médico puede ayudarle a decidir qué es lo adecuado para usted. · La prueba de Papanicolaou y el examen [place] a hacerse pruebas de Papanicolaou a los 21 años. El Papanicolaou es la mejor manera de detectar el cáncer de jaquan uterino. Esta prueba suele ser parte del examen pélvico. Pregunte con qué frecuencia debe hacerse esta prueba. · Infecciones de transmisión sexual (STI, por chon siglas en inglés). Consulte si debe hacerse pruebas de detección de STI. Puede correr riesgo si tiene [employer] con más de [place] persona, especialmente si chon parejas no utilizan condones. Para los hombres  · Infecciones de transmisión sexual (STI). Consulte si debe hacerse pruebas de detección de STI. Puede correr riesgo si tiene [employer] con más de [place] persona, especialmente si usted no utiliza condón. · Examen para el cáncer testicular. Pregúntele a araujo médico si debería hacerse un examen de testículos con regularidad. · Examen de próstata. Hable con araujo médico para saber si debe hacerse un análisis de amarjit para el cáncer de próstata (prueba del PSA, por chon siglas en inglés). Los expertos difieren en cuanto a si los hombres deben hacerse esta prueba y con qué frecuencia. Algunos especialistas lo recomiendan a las personas mayores de 39 años de origen afroamericano o que tienen un padre o un felice que tuvo cáncer de próstata antes de los 65 Los imer. ¿Cuándo debe pedir ayuda?   Preste especial atención a los cambios en araujo anmol y asegúrese de comunicarse con araujo médico si tiene problemas o síntomas que Nessa Estiven preocupan. ¿Dónde puede encontrar más información en inglés? Kasey Rios a http://nate-vivek.info/. Escriba P072 en la búsqueda para aprender más acerca de \"Visita de control para personas de 18 a 50 años: Instrucciones de cuidado - [ Well Visit, Ages 25 to 48: Care Instructions ]. \"  Revisado: 19 julio, 2016  Versión del contenido: 11.3  © 9706-6822 Healthwise, Incorporated. Las instrucciones de cuidado fueron adaptadas bajo licencia por Good Help Connections (which disclaims liability or warranty for this information). Si usted tiene [place] afección médica o sobre estas instrucciones, siempre pregunte a chavarria profesional de anmol. Healthwise, Incorporated niega toda garantía o responsabilidad por chavarria uso de esta información.

## 2017-07-10 NOTE — PROGRESS NOTES
Coordination of Care  1. Have you been to the ER, urgent care clinic since your last visit? Hospitalized since your last visit? Yes When: 06/2017 due to breast pain, [facility]     2. Have you seen or consulted any other health care providers outside of the [address] [place] [address] since your last visit? Include any pap smears or colon screening. No    Medications  Medication Reconciliation Performed: no  Patient does not know need refills     Learning Assessment Complete?  yes

## 2017-07-10 NOTE — PROGRESS NOTES
Assessment/Plan:    Nirali Valverde was seen today for well woman and breast pain. Diagnoses and all orders for this visit:    Encounter for well woman exam  -     PAP, LB, RFX HPV [id number]([id number])    Painful menstrual periods  -     ibuprofen (MOTRIN) 600 mg tablet; Take 1 Tab by mouth every six (6) hours as needed for Pain. Kassidy 1 pastilla cada 6 horas si necissita por chavarria dolor        Follow-up Disposition:  Return in about 1 year (around 7/10/2018), or if symptoms worsen or fail to improve. BROOKLYN Carbajal expressed understanding of this plan. An AVS was printed and given to the patient.      ----------------------------------------------------------------------    Chief Complaint   Patient presents with    Well Woman     pap smear    Breast pain     on and off breast pain 9-10/10, swealling and redness. pt states that about 2 months ago she used to feel a little bump in her right , pt states that she had a \"little ball\" removed from this breast about 4 years ago       History of Present Illness:  Pt missed her mammo on 17 due to car problems. She will need to reschedule through [facility]. She is , both delivered via . She had a BTL at her last delivery. She is in a relationship with a partner- no DV with him. No pain with intercourse. The left breast pain that prompted her ER visit last month has resolved and the mass that she had previously felt is resolved as well. She does not have any pain in her breasts today. She has no family hx of breast cancer. Last pap was  - results in her chart. Her last mammo was several years ago. She has had a benign cyst removed in her left breast about 3 years ago.           Past Medical History:   Diagnosis Date    Allergic rhinitis, cause unspecified 2013    Chest pain 2013    Colitis 2013    Dermatitis due to drugs and medicines taken internally 2013    Edema 2013    Gallstones     Leg pain 2/25/2013    Lump or mass in breast 2/25/2013    Mastodynia 2/25/2013    Unspecified essential hypertension 2/25/2013    Unspecified otitis media 2/25/2013       Current Outpatient Prescriptions   Medication Sig Dispense Refill    ibuprofen (MOTRIN) 600 mg tablet Take 1 Tab by mouth every six (6) hours as needed for Pain. [place] 1 pastilla cada 6 horas si necissita por chavarria dolor 60 Tab 1    naproxen (NAPROSYN) 500 mg tablet Take 1 Tab by mouth every twelve (12) hours as needed for Pain. 20 Tab 0    ferrous sulfate (IRON, FERROUS SULFATE,) 325 mg (65 mg iron) tablet Take 1 Tab by mouth Daily (before breakfast). 90 Tab 1    amitriptyline (ELAVIL) 10 mg tablet Take 1 Tab by mouth nightly as needed for Sleep. chhaya 1 tab por la boca antes de acostarse 30 Tab 1    hydroCHLOROthiazide (MICROZIDE) 12.5 mg capsule Take 1 Cap by mouth daily. Chhaya 1 capsula cada ale para los pies 90 Cap 1    loratadine (CLARITIN) 10 mg tablet Take 1 Tab by mouth daily. Chhaya 1 tableta cada ale para la garganta 90 Tab 1    triamcinolone (NASACORT) 55 mcg nasal inhaler 1 [place] by Both Nostrils route two (2) times a day. 1 spray cada fosa nasal 2 veces al ale 1 Bottle 1       No Known Allergies    Social History   Substance Use Topics    Smoking status: Never Smoker    Smokeless tobacco: Never Used    Alcohol use No       Family History   Problem Relation Age of Onset    Diabetes       neice       Physical Exam:     Visit Vitals    /56 (BP 1 Location: Left arm, BP Patient Position: Sitting)    Pulse 76    Temp 98 °F (36.7 °C) (Oral)    Ht 5' 0.87\" (1.546 m)    Wt 155 lb 3.2 oz (70.4 kg)    LMP 06/09/2017    BMI 29.45 kg/m2       A&Ox3  [facility] NAD  Respirations normal and non labored  Breast exam- surgical scar around nipple and single scar medially left lower breast. No masses or tenderness reproduced on exam today.  No nipple changes, no skin color changes, no warmth of breast, no redness  Pelvic exam- ext neg for discharge or lesions. Cervix and vagina neg for lesions or discharge. No mass or tenderness on exam of uterus or adnexal region.  Scar tissue present with thickening low transverse scar

## 2017-07-10 NOTE — PROGRESS NOTES
Avs discussed with Artemio Quick by Discharge Nurse Chanel Lemus LPN with  [facility] Rol[facility]. Pt given info to [facility] to make appt for mammogram.  Pt given info to [facility] care [facility] site.  AVS printed and given to patient Chanel Lemus LPN

## 2017-07-13 ENCOUNTER — OFFICE VISIT (OUTPATIENT)
Dept: FAMILY MEDICINE CLINIC | Age: 47
End: 2017-07-13

## 2017-07-13 ENCOUNTER — HOSPITAL ENCOUNTER (OUTPATIENT)
Dept: LAB | Age: 47
Discharge: HOME | End: 2017-07-13

## 2017-07-13 VITALS
WEIGHT: 156 LBS | SYSTOLIC BLOOD PRESSURE: 121 MMHG | TEMPERATURE: 98.4 F | BODY MASS INDEX: 29.61 KG/M2 | HEART RATE: 68 BPM | DIASTOLIC BLOOD PRESSURE: 68 MMHG

## 2017-07-13 DIAGNOSIS — E66.3 OVERWEIGHT: ICD-10-CM

## 2017-07-13 DIAGNOSIS — B35.1 TOENAIL FUNGUS: ICD-10-CM

## 2017-07-13 DIAGNOSIS — D64.9 ANEMIA, UNSPECIFIED TYPE: ICD-10-CM

## 2017-07-13 DIAGNOSIS — R60.9 EDEMA, UNSPECIFIED TYPE: ICD-10-CM

## 2017-07-13 DIAGNOSIS — B35.1 TOENAIL FUNGUS: Primary | ICD-10-CM

## 2017-07-13 DIAGNOSIS — G47.00 INSOMNIA, UNSPECIFIED TYPE: ICD-10-CM

## 2017-07-13 LAB
ALBUMIN SERPL BCP-MCNC: 3.9 G/DL (ref 3.5–5)
ALBUMIN/GLOB SERPL: 1 {RATIO} (ref 1.1–2.2)
ALP SERPL-CCNC: 64 U/L (ref 45–117)
ALT SERPL-CCNC: 22 U/L (ref 12–78)
ANION GAP BLD CALC-SCNC: 8 MMOL/L (ref 5–15)
AST SERPL W P-5'-P-CCNC: 11 U/L (ref 15–37)
BILIRUB SERPL-MCNC: 0.4 MG/DL (ref 0.2–1)
BUN SERPL-MCNC: 7 MG/DL (ref 6–20)
BUN/CREAT SERPL: 11 (ref 12–20)
CALCIUM SERPL-MCNC: 9.4 MG/DL (ref 8.5–10.1)
CHLORIDE SERPL-SCNC: 102 MMOL/L (ref 97–108)
CO2 SERPL-SCNC: 27 MMOL/L (ref 21–32)
CREAT SERPL-MCNC: 0.65 MG/DL (ref 0.55–1.02)
ERYTHROCYTE [DISTWIDTH] IN BLOOD BY AUTOMATED COUNT: 13.7 % (ref 11.5–14.5)
GLOBULIN SER CALC-MCNC: 3.8 G/DL (ref 2–4)
GLUCOSE SERPL-MCNC: 84 MG/DL (ref 65–100)
HCT VFR BLD AUTO: 37.7 % (ref 35–47)
HGB BLD-MCNC: 12.4 G/DL (ref 11.5–16)
MCH RBC QN AUTO: 30 PG (ref 26–34)
MCHC RBC AUTO-ENTMCNC: 32.9 G/DL (ref 30–36.5)
MCV RBC AUTO: 91.3 FL (ref 80–99)
PLATELET # BLD AUTO: 291 K/UL (ref 150–400)
POTASSIUM SERPL-SCNC: 3.8 MMOL/L (ref 3.5–5.1)
PROT SERPL-MCNC: 7.7 G/DL (ref 6.4–8.2)
RBC # BLD AUTO: 4.13 M/UL (ref 3.8–5.2)
SODIUM SERPL-SCNC: 137 MMOL/L (ref 136–145)
WBC # BLD AUTO: 7.8 K/UL (ref 3.6–11)

## 2017-07-13 PROCEDURE — 80053 COMPREHEN METABOLIC PANEL: CPT | Performed by: PHYSICIAN ASSISTANT

## 2017-07-13 PROCEDURE — 85027 COMPLETE CBC AUTOMATED: CPT | Performed by: PHYSICIAN ASSISTANT

## 2017-07-13 PROCEDURE — 83036 HEMOGLOBIN GLYCOSYLATED A1C: CPT | Performed by: PHYSICIAN ASSISTANT

## 2017-07-13 RX ORDER — AMITRIPTYLINE HYDROCHLORIDE 10 MG/1
10 TABLET, FILM COATED ORAL
Qty: 30 TAB | Refills: 1 | Status: SHIPPED | OUTPATIENT
Start: 2017-07-13

## 2017-07-13 RX ORDER — HYDROCHLOROTHIAZIDE 12.5 MG/1
12.5 CAPSULE ORAL DAILY
Qty: 90 CAP | Refills: 1 | Status: SHIPPED | OUTPATIENT
Start: 2017-07-13

## 2017-07-13 RX ORDER — LANOLIN ALCOHOL/MO/W.PET/CERES
325 CREAM (GRAM) TOPICAL
Qty: 90 TAB | Refills: 1 | Status: SHIPPED | OUTPATIENT
Start: 2017-07-13

## 2017-07-13 RX ORDER — TERBINAFINE HYDROCHLORIDE 250 MG/1
250 TABLET ORAL DAILY
Qty: 30 TAB | Refills: 2 | Status: SHIPPED | OUTPATIENT
Start: 2017-07-13

## 2017-07-13 NOTE — PROGRESS NOTES
Printed AVS, provided to pt and reviewed. Pt indicated understanding and had no questions. Told pt that rx's have been sent to pharmacy and they should be ready for  in approximately 2 hrs. Reviewed all medications ordered today with the pt. Referred to the registrar to schedule f/u in 3 month's. Holly Dunbar was the .  Beth Garay RN

## 2017-07-13 NOTE — PATIENT INSTRUCTIONS
Hongos en las uñas de los pies: Instrucciones de cuidado - [ Toenail Fungus: Care Instructions ]  Instrucciones de cuidado  Osvaldo uña del pie infectada por un hongo, por lo general, se torna blancuzca o amarillenta. A medida que el hongo se propaga, la uña se oscurece y [place]vet[place]ana, y los bordes comienzan a descamarse y quebrarse. Osvaldo infección severa puede causar dolor en el dedo del pie y que la uña se desprenda del dedo. Es más probable que las uñas de los dedos que están expuestas a la humedad y al calor se infecten por un hongo. [place] podría ocurrir debido al uso frecuente de calzado que josué sudar mucho el pie y a caminar descalzo sobre pisos de duchas. Los hongos de las uñas del pie son difíciles de tratar y la infección puede volver a producirse osvaldo vez que se la ha eliminado. Sin embargo, a veces, los [employer] pueden [employer] de las uñas del pie para siempre. Si la infección es muy grave, o si causa dolor intenso, podría ser necesario extraer la uña. La atención de seguimiento es osvaldo parte clave de chavarria tratamiento y seguridad. Asegúrese de hacer y acudir a todas las citas, y llame a chavarria médico si está teniendo problemas. También es osvaldo buena idea saber los resultados de los exámenes y mantener osvaldo lista de los medicamentos que cheryl. ¿Cómo puede cuidarse en el hogar? · [place] los medicamentos exactamente según las indicaciones. Llame a chavarria médico si tiene algún problema con chon medicamentos. Usted recibirá [employer] medicamentos específicos recetados por chavarria médico.  · Si chavarria médico le indicó osvaldo crema o un líquido para aplicarse sobre la uña del pie, utilícelo exactamente según las indicaciones. · Lávese los pies con frecuencia y Any monica después de tocarse los pies. · [place] 26 uñas del pie secas y limpias. Séquese los pies por completo después de kira un baño, y antes de calzarse y [employer]. · Mantenga cortas las uñas de los pies.   · [employer] con frecuencia. Use medias secas que absorban la humedad. · No camine descalzo en lugares públicos. · Utilice un aerosol o talco que combata los hongos en los pies y los zapatos. · No se rasque la piel alrededor [employer]. · No use esmalte de uñas ni uñas postizas en la uñas de los dedos del pie. ¿Cuándo debe pedir ayuda? Llame a chavarria médico ahora mismo o busque atención médica inmediata si:  · Tiene signos de infección, tales lynda:  ¨ Aumento del dolor, la hinchazón, el enrojecimiento o la temperatura. ¨ Vetas rojizas que salen de osvaldo herida. ¨ Pus que drena del sitio. Torito Debar. · Tiene un dolor nuevo o más intenso en el dedo del pie. Preste especial atención a los cambios en chavarria anmol y asegúrese de comunicarse con chavarria médico si:  · No mejora lynda se esperaba. ¿Dónde puede encontrar más información en inglés? Carmencita End a http://nate-vivek.info/. Escriba D202 en la búsqueda para aprender más acerca de \"Hongos en las uñas de los pies: Instrucciones de cuidado - [ Toenail Fungus: Care Instructions ]. \"  Revisado: 13 octubre, 2016  Versión del contenido: 11.3  © 3283-2181 Healthwise, Incorporated. Las instrucciones de cuidado fueron adaptadas bajo licencia por Good Help Connections (which disclaims liability or warranty for this information). Si usted tiene [place] afección médica o sobre estas instrucciones, siempre pregunte a chavarria profesional de anmol. Healthwise, Incorporated niega toda garantía o responsabilidad por chavarria uso de esta información.

## 2017-07-13 NOTE — PROGRESS NOTES
Assessment/Plan:    Angie Jackson was seen today for nail problem and medication refill. Diagnoses and all orders for this visit:    Toenail fungus  -     terbinafine HCl (LAMISIL) 250 mg tablet; Take 1 Tab by mouth daily.  -     METABOLIC PANEL, COMPREHENSIVE; Future    Overweight  -     HEMOGLOBIN A1C WITH EAG; Future        Anemia, unspecified type  -     CBC W/O DIFF; Future  -     ferrous sulfate (IRON, FERROUS SULFATE,) 325 mg (65 mg iron) tablet; Take 1 Tab by mouth Daily (before breakfast). Edema, unspecified type  -     hydroCHLOROthiazide (MICROZIDE) 12.5 mg capsule; Take 1 Cap by mouth daily. Cheryl 1 capsula cada ale para los pies    Insomnia, unspecified type  -     amitriptyline (ELAVIL) 10 mg tablet; Take 1 Tab by mouth nightly as needed for Sleep. cheryl 1 tab por la boca antes de acostarse    She was instructed to NOT start lamisil until later tomorrow- I will call her IF she should not start the med      Follow-up [facility]: [facility] understanding of this plan. An AVS was printed and given to the patient.      ----------------------------------------------------------------------    Chief Complaint   Patient presents with    Nail Problem     Toe nail Problem    Medication Refill     Medication refill       History of Present Illness:  Several months of worsening changes to her toenails. Has tried all the OTC treatments that she could find and nothing has helped. Wants to take meds. Does not drink ETOH. Last labs were years ago, she understands not to start medication until results of labs are viewed.       Past Medical History:   Diagnosis Date    Allergic rhinitis, cause unspecified 2/25/2013    Chest pain 2/25/2013    Colitis 2/25/2013    Dermatitis due to drugs and medicines taken internally 2/25/2013    Edema 2/25/2013    Gallstones     Leg pain 2/25/2013    Lump or mass in breast 2/25/2013    Mastodynia 2/25/2013    Unspecified essential hypertension 2/25/2013    Unspecified otitis media 2/25/2013       Current Outpatient Prescriptions   Medication Sig Dispense Refill    terbinafine HCl (LAMISIL) 250 mg tablet Take 1 Tab by mouth daily. 30 Tab 2    hydroCHLOROthiazide (MICROZIDE) 12.5 mg capsule Take 1 Cap by mouth daily. Chhaya 1 capsula cada ale para los pies 90 Cap 1    amitriptyline (ELAVIL) 10 mg tablet Take 1 Tab by mouth nightly as needed for Sleep. chhaya 1 tab por la boca antes de acostarse 30 Tab 1    ferrous sulfate (IRON, FERROUS SULFATE,) 325 mg (65 mg iron) tablet Take 1 Tab by mouth Daily (before breakfast). 90 Tab 1    ibuprofen (MOTRIN) 600 mg tablet Take 1 Tab by mouth every six (6) hours as needed for Pain. [place] 1 pastilla cada 6 horas si necissita por chavarria dolor 60 Tab 1    naproxen (NAPROSYN) 500 mg tablet Take 1 Tab by mouth every twelve (12) hours as needed for Pain. 20 Tab 0    loratadine (CLARITIN) 10 mg tablet Take 1 Tab by mouth daily. Chhaya 1 tableta cada ale para la garganta 90 Tab 1    triamcinolone (NASACORT) 55 mcg nasal inhaler 1 [place] by Both Nostrils route two (2) times a day.  1 spray cada fosa nasal 2 veces al ale 1 Bottle 1       No Known Allergies    Social History   Substance Use Topics    Smoking status: Never Smoker    Smokeless tobacco: Never Used    Alcohol use No       Family History   Problem Relation Age of Onset    Diabetes       neice       Physical Exam:     Visit Vitals    /68 (BP 1 Location: Right arm, BP Patient Position: Sitting)    Pulse 68    Temp 98.4 °F (36.9 °C) (Oral)    Wt 156 lb (70.8 kg)    LMP 06/09/2017    BMI 29.61 kg/m2       A&Ox3  WDWN NAD  Respirations normal and non labored

## 2017-07-13 NOTE — MR AVS SNAPSHOT
Visit Information [place] Joseph Personal Médico Departamento Teléfono del Dep. Número de visita 7/13/2017  1:30 PM Treasurelazaro Vasquez [address] [facility]  [phone] Follow-up Instructions Return in about 3 months (around 10/13/2017). Upcoming Health Maintenance Date Due INFLUENZA AGE 9 TO ADULT 8/1/2017 PAP AKA CERVICAL CYTOLOGY 7/10/2020 DTaP/Tdap/Td series (2 - Td) 1/17/2023 Alergias  Review Complete El: 7/10/2017 Por: Best Platt RN  
 A partir del:  7/13/2017 No Known Allergies Vacunas actuales Revisadas el:  1/17/2013 Kaley Warren Influenza Vaccine 1/17/2013 Influenza Vaccine (Quad) PF 10/26/2015, 10/27/2014 Tdap 1/17/2013 No revisadas esta visita You Were Diagnosed With   
  
 Fermín Good Toenail fungus    -  Primary ICD-10-CM: B35.1 ICD-9-CM: 110.1 Overweight     ICD-10-CM: J47.0 ICD-9-CM: 278.02 Anemia, unspecified type     ICD-10-CM: D64.9 ICD-9-CM: 285.9 Edema, unspecified type     ICD-10-CM: R60.9 ICD-9-CM: 782.3 Insomnia, unspecified type     ICD-10-CM: G47.00 ICD-9-CM: 780.52 Partes vitales PS Pulso Temperatura Peso (percentil de crecimiento) LMP (última nara) BMI (IMC)  
 121/68 (BP 1 Location: Right arm, BP Patient Position: Sitting) 68 98.4 °F (36.9 °C) (Oral) 156 lb (70.8 kg) 06/09/2017 29.61 kg/m2 Estado obstétrico Estatus de tabaquísmo Having regular periods Never Smoker Historial de signos vitales BMI and BSA Data Body Mass Index Body Surface Area  
 29.61 kg/m 2 1.74 m 2 [facility] Name Phone [address] [facility] [phone] Araujo lista de medicamentos actualizada Lista actualizada el: 7/13/17  2:14 PM.  Fernando Gallo use araujo lista de medicamentos más reciente. amitriptyline 10 mg tablet También conocido lynda:  ELAVIL Take 1 Tab by mouth nightly as needed for Sleep. cheryl 1 tab por la boca antes de acostarse  
  
 ferrous sulfate 325 mg (65 mg iron) tablet También conocido lynda:  Iron (Ferrous Sulfate) Take 1 Tab by mouth Daily (before breakfast). hydroCHLOROthiazide 12.5 mg capsule También conocido lynda:  Zay Jannette Take 1 Cap by mouth daily. Cheryl 1 capsula cada ale para los pies  
  
 ibuprofen 600 mg tablet También conocido lynda:  MOTRIN Take 1 Tab by mouth every six (6) hours as needed for Pain. [place] 1 pastilla cada 6 horas si necissita por chavarria dolor  
  
 loratadine 10 mg tablet También conocido lynda:  Kathlee Bird Take 1 Tab by mouth daily. Cheryl 1 tableta cada ale para la garganta  
  
 naproxen 500 mg tablet También conocido lynda:  NAPROSYN Take 1 Tab by mouth every twelve (12) hours as needed for Pain. NASACORT 55 mcg nasal inhaler Medicamento genérico:  triamcinolone 1 [place] by Both Nostrils route two (2) times a day. 1 spray cada fosa nasal 2 veces al ale  
  
 terbinafine HCl 250 mg tablet También conocido lynda:  LAMISIL Take 1 Tab by mouth daily. Recetas Enviado a la Early Refills  
 terbinafine HCl (LAMISIL) 250 mg tablet 2 Sig: Take 1 Tab by mouth daily. Class: Normal  
 [id number] [address] [place] [address] Ph #: [phone] Route: Oral  
 hydroCHLOROthiazide (MICROZIDE) 12.5 mg capsule 1 Sig: Take 1 Cap by mouth daily. Cheryl 1 capsula cada ale para los pies Class: Normal  
 [id number] [address] [place] [address] Ph #: [phone] Route: Oral  
 amitriptyline (ELAVIL) 10 mg tablet 1 Sig: Take 1 Tab by mouth nightly as needed for Sleep. cheryl 1 tab por la boca antes de acostarse  Class: Normal  
 Pharmacy: [facility] [address]  #: [phone] Route: Oral  
 ferrous sulfate (IRON, FERROUS SULFATE,) 325 mg (65 mg iron) tablet 1 Sig: Take 1 Tab by mouth Daily (before breakfast). Class: Normal  
 [id number] [address] [place] [address] #: [phone] Route: Oral  
  
Instrucciones de seguimiento Return in about 3 months (around 10/13/2017). Instrucciones para el Paciente Hongos en las uñas de los pies: Instrucciones de cuidado - [ Toenail Fungus: Care Instructions ] Instrucciones de cuidado Joel Mighty del pie infectada por un hongo, por lo general, se torna blancuzca o amarillenta. A medida que el hongo se propaga, la uña se oscurece y Marquez, y los bordes comienzan a descamarse y quebrarse. Osvaldo infección severa puede causar dolor en el dedo del pie y que la uña se desprenda del dedo. Es más probable que las uñas de los dedos que están expuestas a la humedad y al calor se infecten por un hongo. [place] podría ocurrir debido al uso frecuente de calzado que josué sudar mucho el pie y a caminar descalzo sobre pisos de duchas. Los hongos de las uñas del pie son difíciles de tratar y la infección puede volver a producirse osvaldo vez que se la ha eliminado. Sin embargo, a veces, los [employer] pueden [employer] de las uñas del pie para siempre. Si la infección es muy grave, o si causa dolor intenso, podría ser necesario extraer la uña. La atención de seguimiento es osvaldo parte clave de chavarria tratamiento y seguridad. Asegúrese de hacer y acudir a todas las citas, y llame a chavarria médico si está teniendo problemas. También es osvaldo buena idea saber los resultados de los exámenes y mantener osvaldo lista de los medicamentos que cheryl. Cómo puede cuidarse en el hogar? · Destin los medicamentos exactamente según las indicaciones. Llame a chavarria médico si tiene algún problema con chon medicamentos.  Jony Hay detalles sobre los medicamentos específicos recetados por chavarria médico. 
· Si chavarria médico le indicó osvaldo crema o un líquido para aplicarse sobre la uña del pie, utilícelo exactamente según las indicaciones. · Lávese los pies con frecuencia y Yeny-Awais monica después de tocarse los pies. · [place] 26 uñas del pie secas y limpias. Séquese los pies por completo después de kira un baño, y antes de calzarse y [employer]. · Mantenga cortas las uñas de los pies. · [address] medias con frecuencia. Use medias secas que absorban la humedad. · No camine descalzo en lugares públicos. · Utilice un aerosol o talco que combata los hongos en los pies y los zapatos. · No se rasque la piel alrededor [facility]. · No use esmalte de uñas ni uñas postizas en la uñas de los dedos del pie. Cuándo debe pedir ayuda? Llame a chavarria médico ahora mismo o busque atención médica inmediata si: · Tiene signos de infección, tales lynda: ¨ Aumento del dolor, la hinchazón, el enrojecimiento o la temperatura. ¨ Vetas rojizas que salen de osvaldo herida. ¨ Pus que drena del sitio. [place]a [place]. · Tiene un dolor nuevo o más intenso en el dedo del pie. Preste especial atención a los cambios en chavarria anmol y asegúrese de comunicarse con chavarria médico si: 
· No mejora lynda se esperaba. Dónde puede encontrar más información en inglés? Saritha Moulton a [email or web address]-vivek.info/. Escriba D202 en la búsqueda para aprender más acerca de \"Hongos en las uñas de los pies: Instrucciones de cuidado - [ Toenail Fungus: Care Instructions ]. \" 
Revisado: 13 octubre, 2016 Versión del contenido: 11.3 © 5512-8492 [employer], "[employer]. Las instrucciones de cuidado fueron adaptadas bajo licencia por [employer] (which disclaims liability or warranty for this information). Si usted tiene [place] afección médica o sobre estas instrucciones, siempre pregunte a chavarria profesional de anmol.  [employer], "[employer] niega toda adelinaantía o responsabilidad por chavarria uso de esta información. Introducing [facility]! [facility] introduce portal paciente MyChart . Ahora se puede acceder a partes de chavarria expediente médico, enviar por correo electrónico la oficina de chavarria médico y solicitar renovaciones de medicamentos en línea. En chavarria navegador de Internet , Stephanie Huge a [email or web address] Josué clic en el usuario por Guzman Galdamez? Jayjay Ronde clic aquí en la sesión [facility]. Verá la página de registro [place]. Ingrese chavarria código de [facility] caridad y lynda aparece a continuación. Usted no tendrá que [employer] código después de kasi completado el proceso de registro . Si usted no se inscribe antes de la fecha de caducidad , debe solicitar un nuevo código. · MyChart Código de acceso : [id number] Expires: 8/20/2017 10:16 AM 
 
Ingresa los últimos cuatro dígitos de chavarria Número de Seguro Social ( xxxx ) y fecha de nacimiento ( dd / mm / aaaa ) lynda se indica y josué clic en Enviar. Usted será llevado a la siguiente página de registro . Crear un ID MyChart . Esta será chavarria ID de inicio de sesión de MyChart y no puede ser [place] , por lo que pensar en osvaldo que es Perlie Pool y fácil de recordar . Crear osvaldo contraseña MyChart . Usted puede cambiar chavarria contraseña en cualquier momento . Ingrese chavarria Password Reset de preguntas y [place] . [place] se puede utilizar en un momento posterior si usted olvida chavarria contraseña. Introduzca chavarria dirección de correo electrónico . Linnea Loyd recibirá osvaldo notificación por correo electrónico cuando la nueva información está disponible en MyChart . Roselyn sierra en Registrarse. Cristy Hence jaquelin y descargar porciones de chavarria expediente médico. 
Josué mariela en el enlace de descarga del menú Resumen para descargar osvaldo copia portátil de chavarria información médica . Si tiene [employer] , por favor visite la sección de preguntas frecuentes del sitio web MyChart .  Recuerde, MyChart NO es que se utilizará para las [employer]. Para emergencias médicas , llame al 911 . Ahora disponible en chavarria iPhone y Android ! Por favor proporcione rylee resumen de la documentación de cuidado a chavarria próximo proveedor. Your primary care clinician is listed as Sydney Stokes. If you have any questions after today's visit, please call [phone].

## 2017-07-13 NOTE — PROGRESS NOTES
Coordination of Care  1. Have you been to the ER, urgent care clinic since your last visit? Hospitalized since your last visit? No    2. Have you seen or consulted any other health care providers outside of the [address] [place] [address] since your last visit? Include any pap smears or colon screening. No    Medications  Medication Reconciliation Performed: yes  Patient does need refills     Learning Assessment Complete?  yes

## 2017-07-14 LAB
EST. AVERAGE GLUCOSE BLD GHB EST-MCNC: 111 MG/DL
HBA1C MFR BLD: 5.5 % (ref 4.2–6.3)

## 2020-02-05 ENCOUNTER — DOCUMENTATION ONLY (OUTPATIENT)
Dept: FAMILY MEDICINE CLINIC | Age: 50
End: 2020-02-05

## 2020-02-17 ENCOUNTER — DOCUMENTATION ONLY (OUTPATIENT)
Dept: FAMILY MEDICINE CLINIC | Age: 50
End: 2020-02-17

## 2020-02-17 NOTE — PROGRESS NOTES
A letter was sent to the pt in 1/31/20, with the announcement of the new appointment access process. The letter was return back to the office, in 2/10/20 because it was unable to forward to the pt.  Jcarlos Owen CMA

## 2021-10-04 ENCOUNTER — TELEPHONE (OUTPATIENT)
Dept: FAMILY MEDICINE CLINIC | Age: 51
End: 2021-10-04

## 2021-10-04 NOTE — TELEPHONE ENCOUNTER
Received a message on the cbn phone from last week. The Raquel Pappas RN from [facility] in [place] called from telephone # [phone], asking for some medical records for the pt regarding Mammogram and lumpectomy done 8 yrs ago. The nurse was called and spoken to. She stated she had already received the information she had needed. Closing this encounter.  Delores Garcia RN

## 2021-10-15 ENCOUNTER — TELEPHONE (OUTPATIENT)
Dept: FAMILY PLANNING/WOMEN'S HEALTH CLINIC | Age: 51
End: 2021-10-15

## 2021-10-15 NOTE — TELEPHONE ENCOUNTER
Edwina Swift from [address] calling requesting patient's previous mammogram reports and films. Nurse called back no answer left message that to request the films of previous mammograms she will have to call [facility] medical records phone number provided in message. nurse can send the mammogram reports per continuity of care policy but will need fax number to send them to. Waiting for a phone call back.  Sary Galvez RN

## 2021-10-22 ENCOUNTER — TELEPHONE (OUTPATIENT)
Dept: FAMILY PLANNING/WOMEN'S HEALTH CLINIC | Age: 51
End: 2021-10-22

## 2021-10-23 NOTE — TELEPHONE ENCOUNTER
Johana Nurse from [address] [place] [address] calling requesting mammogram reports, breast pathology and office visits for patient. Patient has an upcoming appointment with their office and having breast issues. Dicussed that I would need patient's authorization to disclose the information. The nurse stated that patient does not leave close to the clinic, and is requesting information as urgent and provided me with patient's phone number to request verbal authorization. Nurse called new mobile number - patient's name and  verified- patient agrees for this nurse to release the above requested for continuity of care. Information faxed, fax confirmation received will scan to chart.  Franklyn Daly RN